# Patient Record
(demographics unavailable — no encounter records)

---

## 2023-02-28 NOTE — PROGRESS NOTES
DATE: 2/28/2023  PATIENT: Tere Lozoya    Attending Physician: Aaron Gambino M.D.    CHIEF COMPLAINT:  Independent Medical Examination at the request of the [facility]    HISTORY:    Tere Lozoya is a 55-year-old right-hand dominant female who presents for an independent medical examination.  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  While exiting a building, the patient reports that her glasses fogged, and she tripped over a cement barricade.  The patient reports he initially had significant left knee pain.  At that time she was uninsured and did not go to the doctor.  One month later, after reporting ongoing left knee pain, she eventually was seen at the [facility].  There the patient was treated conservatively with injections, but because of ongoing knee pain, ultimately underwent a left knee arthroscopic surgery on Ailyn 10, 2021.  The patient reports that overall her knee surgery went well, and her left knee pain is much improved.  She still reports occasional popping.  Apparently she reports that she was told that she does need a knee replacement.  The patient reports pain on average in her left knee from between 0 to 6/10.  She is using a cane in her right hand to help with her knee pain.  Since surgery she is had multiple injections, including viscosupplementation.      The also reports excruciating neck pain that she relates to the accident.  This has become progressively worse.  She is not had any formal treatment for this besides Vicodin and Flexeril, no physical therapy, no injections.  The patient reports a sensation of crepitus in her neck.  She reports on average her pain is between 6 and 9/10.  The patient also endorses right radial forearm paresthesias.  She denies myelopathic symptoms such as difficulty with buttons, coins, keys or small objects.  The patient reports that  she did not have any neck pain prior to the on-the-job injury    The patient also reports right shoulder pain.  She describes this as aching, throbbing, and spasms.  She describes her shoulder pain as ranging from between 6 to 9/10.  Aside from the Flexeril and Vicodin that she takes for her neck, she is not had any formal treatment.  The patient reports that she did not have any shoulder pain prior to the on-the-job injury.      Ms. Lozoya reports that she was able to work for approximately 10 months after the accident with limitations, but has since stopped working.  She was last working in 2021.    Ms. Lozoya denies any family history of neck or back problems.    PAST MEDICAL HISTORY:  Hypertension  Hypercholesterolemia  Lower extremity edema    PAST SURGICAL HISTORY:  History of  section x2  History of fistula surgery  History of left knee arthroscopic surgery      CURRENT MEDICATIONS:  Hydrochlorothiazide  Lisinopril  Atorvastatin  Vicodin  Flexeril    SOCIAL HISTORY:  The patient is a 1 pack per day smoker          PHYSICAL EXAMINATION:    General:  The patient is a 55-year-old female appearing stated age.  She is tangential during the interview.  Gait:  The patient ambulates with an antalgic gait using a cane in her right hand.  She appears to be limping on her left lower extremity.  Skin:  Cervical and lumbar skin negative for rashes, lesions, hairy patches and surgical scars. There is exaggerated posterior cervical tenderness to palpation.    Range of motion: There is perhaps a trace decreased in right cervical rotation compared to contralateral. Lumbar range of motion is acceptable.  Spinal Balance: Global saggital and coronal spinal balance acceptable, no significant for scoliosis and kyphosis.  Musculoskeletal:  The patient's right shoulder examination is notable for limited passive and active abduction to 60°, as well as forward elevation to 95°.  She has equivocal Neer's and Carrera sign  on the right.  She has diffuse right trapezial tenderness to palpation.  The patient's left knee demonstrates small scars consistent with prior arthroscopic surgery.  There is mild diffuse tenderness to palpation.  There is no varus or valgus laxity at either 0 or 30°.  There is negative patellar grind test.  Negative Lachman's sign.  No pain with the range of motion of the bilateral hips. No trochanteric tenderness to palpation.  Vascular: Bilateral lower extremities warm and well perfused, Dorsalis pedis pulses 2+ bilaterally.  Neurological: Normal strength and tone in all major motor groups in the bilateral upper and lower extremities. Normal sensation to light touch in the C3-T1, and L2-S1 dermatomes bilaterally.  Deep tendon reflexes symmetric 1+ in the bilateral upper and bilateral lower extremities.  Negative Babinski bilaterally. Straight leg raise negative bilaterally.  Provocative maneuvers:  The patient reports cervical spine pain with axial loading, as well as pain with gentle range of motion.    IMAGING:      I was personally able to review an MRI of the lumbar spine dated 04/27/2022 this demonstrates the following:  The patient has no evidence of significant spondylosis, evidence of instability or vertebral fractures.  All visualized lumbar discs appear to be well hydrated, with no evidence of focal disc desiccation, disc herniation, or significant central or foraminal stenosis.        ASSESSMENT: